# Patient Record
Sex: MALE | Race: WHITE | URBAN - METROPOLITAN AREA
[De-identification: names, ages, dates, MRNs, and addresses within clinical notes are randomized per-mention and may not be internally consistent; named-entity substitution may affect disease eponyms.]

---

## 2017-08-10 ENCOUNTER — HISTORICAL (OUTPATIENT)
Dept: LAB | Facility: HOSPITAL | Age: 52
End: 2017-08-10

## 2017-08-10 LAB
FOLATE SERPL-MCNC: 9.8 NG/ML (ref 3.1–17.5)
IRON SERPL-MCNC: 87 MCG/DL (ref 50–175)
VIT B12 SERPL-MCNC: 367 PG/ML (ref 193–986)

## 2021-07-18 ENCOUNTER — HISTORICAL (OUTPATIENT)
Dept: ADMINISTRATIVE | Facility: HOSPITAL | Age: 56
End: 2021-07-18

## 2021-08-04 ENCOUNTER — HISTORICAL (OUTPATIENT)
Dept: ADMINISTRATIVE | Facility: HOSPITAL | Age: 56
End: 2021-08-04

## 2021-11-29 ENCOUNTER — HISTORICAL (OUTPATIENT)
Dept: ADMINISTRATIVE | Facility: HOSPITAL | Age: 56
End: 2021-11-29

## 2025-03-27 ENCOUNTER — HOSPITAL ENCOUNTER (OUTPATIENT)
Dept: RADIOLOGY | Facility: HOSPITAL | Age: 60
Discharge: HOME | End: 2025-03-27
Attending: FAMILY MEDICINE
Payer: MEDICARE

## 2025-03-27 DIAGNOSIS — M54.50 LUMBAGO: ICD-10-CM

## 2025-03-27 PROCEDURE — 72100 X-RAY EXAM L-S SPINE 2/3 VWS: CPT | Mod: TC

## 2025-04-15 ENCOUNTER — HOSPITAL ENCOUNTER (OUTPATIENT)
Dept: RADIOLOGY | Facility: HOSPITAL | Age: 60
Discharge: HOME | End: 2025-04-15
Attending: FAMILY MEDICINE
Payer: MEDICARE

## 2025-04-15 DIAGNOSIS — M54.50 LOW BACK PAIN: ICD-10-CM

## 2025-04-15 PROCEDURE — 72148 MRI LUMBAR SPINE W/O DYE: CPT | Mod: TC

## 2025-05-01 DIAGNOSIS — M48.061 LUMBAR STENOSIS: Primary | ICD-10-CM

## 2025-05-06 ENCOUNTER — TELEPHONE (OUTPATIENT)
Dept: PAIN MEDICINE | Facility: CLINIC | Age: 60
End: 2025-05-06
Payer: MEDICARE

## 2025-05-06 NOTE — TELEPHONE ENCOUNTER
----- Message from Marina sent at 5/6/2025 12:51 PM CDT -----  Contact: pt  Type:  Patient Returning CallWho Called:ptWho Left Message for Patient:EdenDoes the patient know what this is regarding?:missed call about apptWould the patient rather a call back or a response via [facility]? Call [facility] Call Back Number:[phone] Additional Information: none

## 2025-06-11 ENCOUNTER — OFFICE VISIT (OUTPATIENT)
Dept: PAIN MEDICINE | Facility: CLINIC | Age: 60
End: 2025-06-11
Payer: MEDICARE

## 2025-06-11 VITALS
SYSTOLIC BLOOD PRESSURE: 133 MMHG | BODY MASS INDEX: 31.58 KG/M2 | WEIGHT: 185 LBS | DIASTOLIC BLOOD PRESSURE: 91 MMHG | HEART RATE: 80 BPM | HEIGHT: 64 IN | OXYGEN SATURATION: 98 %

## 2025-06-11 DIAGNOSIS — G89.29 CHRONIC BILATERAL LOW BACK PAIN WITHOUT SCIATICA: Primary | ICD-10-CM

## 2025-06-11 DIAGNOSIS — M79.18 MYOFASCIAL PAIN: ICD-10-CM

## 2025-06-11 DIAGNOSIS — M54.51 VERTEBROGENIC LOW BACK PAIN: ICD-10-CM

## 2025-06-11 DIAGNOSIS — M54.50 CHRONIC BILATERAL LOW BACK PAIN WITHOUT SCIATICA: Primary | ICD-10-CM

## 2025-06-11 PROCEDURE — 99204 OFFICE O/P NEW MOD 45 MIN: CPT | Mod: S$PBB,,, | Performed by: PHYSICAL MEDICINE & REHABILITATION

## 2025-06-11 RX ORDER — TAMSULOSIN HYDROCHLORIDE 0.4 MG/1
1 CAPSULE ORAL NIGHTLY
COMMUNITY
Start: 2025-05-19

## 2025-06-11 RX ORDER — TERBINAFINE HYDROCHLORIDE 250 MG/1
250 TABLET ORAL
COMMUNITY
Start: 2025-04-23 | End: 2025-06-11

## 2025-06-11 RX ORDER — DEXTROMETHORPHAN HYDROBROMIDE AND QUINIDINE SULFATE 20; 10 MG/1; MG/1
1 CAPSULE, GELATIN COATED ORAL 2 TIMES DAILY
COMMUNITY
Start: 2025-05-12

## 2025-06-11 RX ORDER — TEMAZEPAM 30 MG/1
30 CAPSULE ORAL NIGHTLY
COMMUNITY
Start: 2025-05-08

## 2025-06-11 RX ORDER — BUSPIRONE HYDROCHLORIDE 15 MG/1
15 TABLET ORAL 2 TIMES DAILY
COMMUNITY
Start: 2025-05-05

## 2025-06-11 RX ORDER — OXYCODONE AND ACETAMINOPHEN 10; 325 MG/1; MG/1
1 TABLET ORAL EVERY 8 HOURS PRN
COMMUNITY
Start: 2025-04-24 | End: 2025-06-11

## 2025-06-11 RX ORDER — OXCARBAZEPINE 150 MG/1
150 TABLET, FILM COATED ORAL 2 TIMES DAILY
COMMUNITY
Start: 2025-03-31

## 2025-06-11 RX ORDER — PRAZOSIN HYDROCHLORIDE 1 MG/1
2 CAPSULE ORAL NIGHTLY
COMMUNITY
Start: 2025-05-27

## 2025-06-11 RX ORDER — BACLOFEN 10 MG/1
10 TABLET ORAL NIGHTLY PRN
Qty: 30 TABLET | Refills: 2 | Status: SHIPPED | OUTPATIENT
Start: 2025-06-11 | End: 2025-09-09

## 2025-06-11 RX ORDER — PROPRANOLOL HYDROCHLORIDE 20 MG/1
TABLET ORAL
COMMUNITY
Start: 2025-05-19

## 2025-06-11 RX ORDER — CELECOXIB 200 MG/1
200 CAPSULE ORAL DAILY PRN
COMMUNITY
Start: 2025-03-26

## 2025-06-11 RX ORDER — SEMAGLUTIDE 0.68 MG/ML
INJECTION, SOLUTION SUBCUTANEOUS
COMMUNITY
Start: 2025-05-05

## 2025-06-11 RX ORDER — DESVENLAFAXINE 100 MG/1
100 TABLET, EXTENDED RELEASE ORAL
COMMUNITY
Start: 2025-05-19

## 2025-06-11 RX ORDER — TRAMADOL HYDROCHLORIDE 50 MG/1
50 TABLET, FILM COATED ORAL EVERY 6 HOURS PRN
COMMUNITY
Start: 2025-05-29

## 2025-06-11 RX ORDER — METHOCARBAMOL 500 MG/1
500 TABLET, FILM COATED ORAL 2 TIMES DAILY PRN
COMMUNITY
Start: 2025-03-26 | End: 2025-06-11

## 2025-06-11 RX ORDER — AMANTADINE HYDROCHLORIDE 100 MG/1
CAPSULE, GELATIN COATED ORAL
COMMUNITY
Start: 2025-05-26

## 2025-06-11 RX ORDER — DIVALPROEX SODIUM 500 MG/1
TABLET, FILM COATED, EXTENDED RELEASE ORAL
COMMUNITY
Start: 2025-05-26

## 2025-06-11 RX ORDER — CLONAZEPAM 1 MG/1
TABLET ORAL
COMMUNITY
Start: 2025-01-02

## 2025-06-11 RX ORDER — ATORVASTATIN CALCIUM 10 MG/1
10 TABLET, FILM COATED ORAL
COMMUNITY
Start: 2025-05-05

## 2025-06-11 NOTE — PROGRESS NOTES
[facility]      Referring Provider: Ramez Castillo Iii, Md  [address] [facility] [place] [address]    Chief Complaint:   Chief Complaint   Patient presents with    Low-back Pain       History of Present Illness: Andrés Lees is a 60 y.o. male referred by Dr. Ramez Castillo MD for low back pain.      LBP:  Onset: years of back pain and started seeking care about 6 months ago  Location: bilateral, but primarily right low back from the inferior rib to the iliac crest primarily over the QL  Radiation: none  Quality: Stabbing and Deep  Timing: Severity fluctuates, but always present  Exacerbating Factors: Bending Forward, Lifting, and Twisting/Turning  Alleviating Factors: Nothing  Review of Symptoms: Denies weakness, Denies numbness, Denies night sweats, Denies fevers, (+) pain waking at night, Denies history of cancer, Denies unexplained weight loss, Denies changes in bowel function, Denies changes in bladder function.    Patient has failed > 6 weeks of conservative treatment including: Activity modification (avoiding exacerbating factors) and oral medications.     Pain Severity Scores:   Currently: 4/10      Worst Pain: 9/10     Least Pain: 4/10  Average Pain: 5/10    Pain Disability Index  Family/Home Responsibilities:: 8  Recreation:: 4  Social Activity:: 3  Occupation:: 7  Sexual Behavior:: 10  Self Care:: 3  Life-Support Activities:: 3  Pain Disability Index (PDI): 38      Opioid Risk Score       None             Previous Therapies/Treatments:  Activity Modification (rest, avoiding aggravating factors, etc.): Yes - Not Helpful  Heat (heating pads, etc.):   Cold (ice packs):   Physician guided home exercise program:   PT/OT:   Chiropractor:   Acupuncture:   Massage:   Bracing:   TENS unit:     Previous Medications:   - NSAIDS: tylenol and ibuprofen don't help. celebrex  - Muscle Relaxants: robaxin  - TCAs:   - SNRIs/Antidepressants for Pain: desvenlafaxine  - Topicals:   -  "Anticonvulsants: oxcarbazepine  - Opioids: tramadol     Relevant Surgery: yes    - Cervical decompression and fusion many years ago    Previous Interventions for Pain:  -     Current Pain Medications:  Tramadol 50 mg  Celebrex 200 mg     Blood Thinners: none    Full Medication List:  Current Medications[1]     Review of Systems: See HPI    Allergies:  Biaxin [clarithromycin]      Medical History:   has a past medical history of Anxiety, Bipolar disorder, Diabetes mellitus, type 2, GERD (gastroesophageal reflux disease), Kidney stones, and Migraines.    Surgical History:   has a past surgical history that includes Spinal cord decompression.    Social History:   reports that he has never smoked. He has never used smokeless tobacco. He reports current alcohol use. He reports that he does not use drugs.    Family History:  family history includes Brain aneurysm in his mother; Suicide in his father.      Physical Exam:  BP (!) 133/91   Pulse 80   Ht 5' 4" (1.626 m)   Wt 83.9 kg (185 lb)   SpO2 98%   BMI 31.76 kg/m²   GEN: No acute distress. Calm, comfortable  HENT: Normocephalic, atraumatic, moist mucous membranes  EYE: Anicteric sclera, non-injected.   CV: Non-diaphoretic. Regular Rate. Radial Pulses 2+.  RESP: Breathing comfortably. Chest expansion symmetric.  EXT: No clubbing, cyanosis.   SKIN: Warm, & dry to palpation. No visible rashes or lesions of exposed skin.   PSYCH: Pleasant mood and appropriate affect. Recent and remote memory intact.   GAIT: Independent, normal ambulation  Lumbar Spine Exam:       Inspection: No erythema, bruising. No Lateral shift.       Palpation:   - (+) TTP of lumbar paraspinals on right.  - (-) TTP of sacroiliac joint bilaterally.  - (-) TTP of mildline spinous processes       ROM: (+) Limited in flexion. (+) limited in extension, (+) limitation in lateral bending bilateral.    Directional Preference:       Provocative Maneuvers:  (+) Facet loading on right  (-) Straight Leg Raise " bilaterally  (-) LEV bilaterally  Hip Exam:      Inspection: No gross deformity or apparent leg length discrepancy      Palpation: No TTP to greater trochanteric bursa(s) bilaterally.       ROM: (+) limitation in internal rotation bilateral, limitation in external rotation bilateral  Neurologic Exam:     Alert. Speech is fluent and appropriate.     Strength:  5/5 throughout bilateral lower extremities     Sensation:  Grossly intact to light touch in bilateral lower extremities     Reflexes: 2+ in b/l patella, achilles     Tone: No abnormality appreciated in bilateral lower extremities     No Clonus     Downgoing toes on plantar stimulation     (-) Cadena bilaterally                Imaging:  - MRI Lumbar Spine 3/27/25:     Alignment: Normal.     Vertebrae: No acute fracture or diffuse marrow placement process.  Heterogeneous marrow signal is likely related to marrow reconversion and small hemangiomas.     Discs: Multilevel disc desiccation.  Moderate disc space narrowing at T11-T12.  Small annular fissure at L3-L4.     Cord: Normal.  Conus terminates at L2.     Degenerative findings:     T12-L1: No significant spinal canal stenosis or neural foraminal narrowing.     L1-L2: No significant spinal canal stenosis or neural foraminal narrowing.     L2-L3: Circumferential broad-based disc bulge and mild facet arthropathy.  Mild spinal canal stenosis.  Moderate bilateral lateral recess narrowing.  Mild left neural foraminal narrowing.     L3-L4: Circumferential broad-based disc bulge, ligamentum flavum buckling and mild facet arthropathy.  Mild spinal canal stenosis.  Moderate bilateral lateral recess narrowing.  Mild bilateral neural foraminal narrowing.     L4-L5: Circumferential broad-based disc bulge and mild left facet arthropathy.  Mild lateral recess narrowing bilaterally.  Mild bilateral neural foraminal narrowing.     L5-S1: Small broad-based posterior disc bulge and mild facet arthropathy.  No significant  "spinal canal stenosis.  Mild right neural foraminal narrowing.     Paraspinal muscles & soft tissues: Unremarkable.    Labs:  BMP  No results found for: "NA", "K", "CL", "CO2", "BUN", "CREATININE", "CALCIUM", "ANIONGAP", "EGFRNORACEVR"  No results found for: "ALT", "AST", "GGT", "ALKPHOS", "BILITOT"  No results found for: "PLT"  No results found for: "LABA1C", "HGBA1C"      Assessment:  Andrés Lees is a 60 y.o. male with the following diagnoses based on history, exam, and imagin. Chronic bilateral low back pain without sciatica  -     Ambulatory referral/consult to Pain Clinic  -     Ambulatory Referral/Consult to Physical Therapy; Future; Expected date: 2025  -     baclofen (LIORESAL) 10 MG tablet; Take 1 tablet (10 mg total) by mouth nightly as needed (pain or spasm).  Dispense: 30 tablet; Refill: 2    2. Vertebrogenic low back pain  -     Ambulatory Referral/Consult to Physical Therapy; Future; Expected date: 2025  -     baclofen (LIORESAL) 10 MG tablet; Take 1 tablet (10 mg total) by mouth nightly as needed (pain or spasm).  Dispense: 30 tablet; Refill: 2    3. Myofascial pain  -     Ambulatory Referral/Consult to Physical Therapy; Future; Expected date: 2025  -     baclofen (LIORESAL) 10 MG tablet; Take 1 tablet (10 mg total) by mouth nightly as needed (pain or spasm).  Dispense: 30 tablet; Refill: 2        This is a pleasant 60 y.o. gentleman presenting with:     - Chronic bilateral low back pain:   - Multifactorial, primary pain appears to be strain of the quadratus lumborum, but he does have facet arthropathy as well and Modic 2 at L5-S1.   - Comorbidities: GERD. DM2. Bipolar. Anxiety. GERD. Migraines. Kidney stones. Tremor.     Treatment Plan:   - PT/OT/HEP: Refer to PT. Discussed benefits of exercise for pain.   - Procedures: Consider TPI into right QL   - Consider MBBs   - Consider intracept  - Medications: Rx for baclofen 10 mg qHS  - Imaging: Reviewed.   - Labs: " None      Follow Up: RTC in 6-8 weeks or sooner PRN      Lazara Lama MD  Interventional Pain Medicine          [1]   Current Outpatient Medications:     amantadine HCL (SYMMETREL) 100 mg capsule, TAKE 1 OR 2 CAPSULES BY MOUTH EVERY MORNING OR ONE CAPSULE EVERY MORNING AND ONE CAPSULE MIDDAY, Disp: , Rfl:     atorvastatin (LIPITOR) 10 MG tablet, Take 10 mg by mouth., Disp: , Rfl:     busPIRone (BUSPAR) 15 MG tablet, Take 15 mg by mouth 2 (two) times daily., Disp: , Rfl:     celecoxib (CELEBREX) 200 MG capsule, Take 200 mg by mouth daily as needed., Disp: , Rfl:     clonazePAM (KLONOPIN) 1 MG tablet, Take by mouth., Disp: , Rfl:     desvenlafaxine succinate (PRISTIQ) 100 MG Tb24, Take 100 mg by mouth., Disp: , Rfl:     divalproex ER (DEPAKOTE ER) 500 MG Tb24 24 hr tablet, TAKE 1 TABLET BY MOUTH EVERY MORNING AND 3 TABLETs AT BEDTIME, Disp: , Rfl:     NUEDEXTA 20-10 mg per capsule, Take 1 capsule by mouth 2 (two) times daily., Disp: , Rfl:     OXcarbazepine (TRILEPTAL) 150 MG Tab, Take 150 mg by mouth 2 (two) times daily., Disp: , Rfl:     OZEMPIC 0.25 mg or 0.5 mg (2 mg/3 mL) pen injector, inject 0.25 MG into THE SKIN ONCE WEEKLY, Disp: , Rfl:     prazosin (MINIPRESS) 1 MG Cap, Take 2 mg by mouth every evening., Disp: , Rfl:     propranoloL (INDERAL) 20 MG tablet, TAKE 1 OR 2 TABLETS BY MOUTH THREE TIMES A DAY AS NEEDED, Disp: , Rfl:     tamsulosin (FLOMAX) 0.4 mg Cap, Take 1 capsule by mouth every evening., Disp: , Rfl:     temazepam (RESTORIL) 30 mg capsule, Take 30 mg by mouth every evening., Disp: , Rfl:     traMADoL (ULTRAM) 50 mg tablet, Take 50 mg by mouth every 6 (six) hours as needed., Disp: , Rfl:     baclofen (LIORESAL) 10 MG tablet, Take 1 tablet (10 mg total) by mouth nightly as needed (pain or spasm)., Disp: 30 tablet, Rfl: 2

## 2025-07-24 ENCOUNTER — OFFICE VISIT (OUTPATIENT)
Dept: PAIN MEDICINE | Facility: CLINIC | Age: 60
End: 2025-07-24
Payer: MEDICARE

## 2025-07-24 VITALS
HEART RATE: 76 BPM | OXYGEN SATURATION: 99 % | DIASTOLIC BLOOD PRESSURE: 90 MMHG | WEIGHT: 180 LBS | SYSTOLIC BLOOD PRESSURE: 159 MMHG | HEIGHT: 63 IN | BODY MASS INDEX: 31.89 KG/M2

## 2025-07-24 DIAGNOSIS — M54.50 CHRONIC BILATERAL LOW BACK PAIN WITHOUT SCIATICA: ICD-10-CM

## 2025-07-24 DIAGNOSIS — M79.18 MYOFASCIAL PAIN: Primary | ICD-10-CM

## 2025-07-24 DIAGNOSIS — G89.29 CHRONIC BILATERAL LOW BACK PAIN WITHOUT SCIATICA: ICD-10-CM

## 2025-07-24 DIAGNOSIS — M54.51 VERTEBROGENIC LOW BACK PAIN: ICD-10-CM

## 2025-07-24 RX ORDER — METHYLPREDNISOLONE ACETATE 40 MG/ML
40 INJECTION, SUSPENSION INTRA-ARTICULAR; INTRALESIONAL; INTRAMUSCULAR; SOFT TISSUE
Status: DISCONTINUED | OUTPATIENT
Start: 2025-07-24 | End: 2025-07-24 | Stop reason: HOSPADM

## 2025-07-24 RX ORDER — LIDOCAINE HYDROCHLORIDE 10 MG/ML
9 INJECTION, SOLUTION EPIDURAL; INFILTRATION; INTRACAUDAL; PERINEURAL
Status: DISCONTINUED | OUTPATIENT
Start: 2025-07-24 | End: 2025-07-24 | Stop reason: HOSPADM

## 2025-07-24 RX ADMIN — METHYLPREDNISOLONE ACETATE 40 MG: 40 INJECTION, SUSPENSION INTRA-ARTICULAR; INTRALESIONAL; INTRAMUSCULAR; SOFT TISSUE at 02:07

## 2025-07-24 RX ADMIN — LIDOCAINE HYDROCHLORIDE 9 ML: 10 INJECTION, SOLUTION EPIDURAL; INFILTRATION; INTRACAUDAL; PERINEURAL at 02:07

## 2025-07-24 NOTE — PROCEDURES
Trigger Point Injection    Performed by: Cleopatra Quintero PA-C  Authorized by: Cleopatra Quintero PA-C    Lumbar Paraspinal:  Right    Consent Done?:  Yes (Written)    Pre-Procedure:   Indications:  Pain  Site marked: the procedure site was marked     Timeout: prior to procedure the correct patient, procedure, and site was verified      Local anesthesia used?: Yes    Anesthesia:  Local infiltration  Local anesthetic:  Lidocaine 1% without epinephrine  Medications: 9 mL LIDOcaine (PF) 10 mg/ml (1%) 10 mg/mL (1 %); 40 mg methylPREDNISolone acetate 40 mg/mL

## 2025-07-24 NOTE — PROGRESS NOTES
[facility]      Chief Complaint:   Chief Complaint   Patient presents with    Back Pain       History of Present Illness: Andrés Lees is a 60 y.o. male referred by Dr. Ramez Castillo MD for low back pain.      LBP:  Onset: years of back pain and started seeking care about 6 months ago  Location: bilateral, but primarily right low back from the inferior rib to the iliac crest primarily over the QL  Radiation: none  Quality: Stabbing and Deep  Timing: Severity fluctuates, but always present  Exacerbating Factors: Bending Forward, Lifting, and Twisting/Turning  Alleviating Factors: Nothing  Review of Symptoms: Denies weakness, Denies numbness, Denies night sweats, Denies fevers, (+) pain waking at night, Denies history of cancer, Denies unexplained weight loss, Denies changes in bowel function, Denies changes in bladder function.    Patient has failed > 6 weeks of conservative treatment including: Activity modification (avoiding exacerbating factors) and oral medications.     Pain Severity Scores:   Currently: 4/10      Worst Pain: 9/10     Least Pain: 4/10  Average Pain: 5/10    Pain Disability Index  Family/Home Responsibilities:: 8  Recreation:: 4  Social Activity:: 3  Occupation:: 7  Sexual Behavior:: 10  Self Care:: 3  Life-Support Activities:: 3  Pain Disability Index (PDI): 38      Opioid Risk Score       None             Previous Therapies/Treatments:  Activity Modification (rest, avoiding aggravating factors, etc.): Yes - Not Helpful  Heat (heating pads, etc.):   Cold (ice packs):   Physician guided home exercise program:   PT/OT:   Chiropractor:   Acupuncture:   Massage:   Bracing:   TENS unit:     Previous Medications:   - NSAIDS: tylenol and ibuprofen don't help. celebrex  - Muscle Relaxants: robaxin  - TCAs:   - SNRIs/Antidepressants for Pain: desvenlafaxine  - Topicals:   - Anticonvulsants: oxcarbazepine  - Opioids: tramadol     Relevant Surgery: yes    - Cervical decompression and  "fusion many years ago    Previous Interventions for Pain:  -     Interval History (07/24/2025):  Andrés Lees returns today for follow up.  At the last clinic visit, referred to physical therapy Discussed benefits of exercise for pain.    Physical therapy provided 0% relief, he has completed approximately 3 weeks.    Currently, the back pain is stable. Continues with pain to right lower back from right rib into right iliac crest. He also continues with pain across bilateral lower back, only with lifting. This is not currently limiting for him. Denies any changes in bowel or bladder function. Denies any new weakness or new numbness since the most recent visit. Denies any fevers or recent infections/antibiotics.     He attempted baclofen, provided limited relief.     Current Pain Scales:  Current: 8/10              Average: 6/10  Least-Worst: 2-9/10           7/24/2025     2:06 PM 6/11/2025     2:12 PM   Last 3 PDI Scores   Pain Disability Index (PDI) 53 38              Current Pain Medications:  Tramadol 50 mg  Celebrex 200 mg     Blood Thinners: none    Full Medication List:  Current Medications[1]     Review of Systems: See HPI    Allergies:  Biaxin [clarithromycin]      Medical History:   has a past medical history of Anxiety, Bipolar disorder, Diabetes mellitus, type 2, GERD (gastroesophageal reflux disease), Kidney stones, and Migraines.    Surgical History:   has a past surgical history that includes Spinal cord decompression.    Social History:   reports that he has never smoked. He has never used smokeless tobacco. He reports current alcohol use. He reports that he does not use drugs.    Family History:  family history includes Brain aneurysm in his mother; Suicide in his father.      Physical Exam:  BP (!) 159/90 (Patient Position: Sitting)   Pulse 76   Ht 5' 3" (1.6 m)   Wt 81.6 kg (180 lb)   SpO2 99%   BMI 31.89 kg/m²   GEN: No acute distress. Calm, comfortable  HENT: Normocephalic, atraumatic, " moist mucous membranes  EYE: Anicteric sclera, non-injected.   CV: Non-diaphoretic. Regular Rate. Radial Pulses 2+.  RESP: Breathing comfortably. Chest expansion symmetric.  EXT: No clubbing, cyanosis.   SKIN: Warm, & dry to palpation. No visible rashes or lesions of exposed skin.   PSYCH: Pleasant mood and appropriate affect. Recent and remote memory intact.   GAIT: Independent, normal ambulation  Lumbar Spine Exam:       Inspection: No erythema, bruising. No Lateral shift.       Palpation:   - (+) TTP of lumbar paraspinals on right.  - (-) TTP of sacroiliac joint bilaterally.  - (-) TTP of mildline spinous processes       ROM: (+) Limited in flexion. (+) limited in extension, (+) limitation in lateral bending bilateral.    Directional Preference:       Provocative Maneuvers:  (+) Facet loading on right  (-) Straight Leg Raise bilaterally  (-) LEV bilaterally  Hip Exam:      Inspection: No gross deformity or apparent leg length discrepancy      Palpation: No TTP to greater trochanteric bursa(s) bilaterally.       ROM: (+) limitation in internal rotation bilateral, limitation in external rotation bilateral  Neurologic Exam:     Alert. Speech is fluent and appropriate.     Strength:  5/5 throughout bilateral lower extremities     Sensation:  Grossly intact to light touch in bilateral lower extremities     Reflexes: 2+ in b/l patella, achilles     Tone: No abnormality appreciated in bilateral lower extremities     No Clonus     Downgoing toes on plantar stimulation     (-) Cadena bilaterally                Imaging:  - X-ray Lumbar spine 3/27/2025:   FINDINGS:  Miscellaneous: Mild vertebral body and facet joint spurring are noted throughout the lower thoracic and lumbar spine.  There is moderate disc space narrowing noted at the T10-T11 and to a lesser extent the T11-T12 levels.     Fractures:  No acute fractures are noted.     Alignment: No clinically significant spondylolisthesis is noted.     Soft tissue: I see  "no evidence of focal soft tissue swelling or paravertebral hematomas.    - MRI Lumbar Spine 3/27/25:  Alignment: Normal.     Vertebrae: No acute fracture or diffuse marrow placement process.  Heterogeneous marrow signal is likely related to marrow reconversion and small hemangiomas.     Discs: Multilevel disc desiccation.  Moderate disc space narrowing at T11-T12.  Small annular fissure at L3-L4.     Cord: Normal.  Conus terminates at L2.     Degenerative findings:     T12-L1: No significant spinal canal stenosis or neural foraminal narrowing.     L1-L2: No significant spinal canal stenosis or neural foraminal narrowing.     L2-L3: Circumferential broad-based disc bulge and mild facet arthropathy.  Mild spinal canal stenosis.  Moderate bilateral lateral recess narrowing.  Mild left neural foraminal narrowing.     L3-L4: Circumferential broad-based disc bulge, ligamentum flavum buckling and mild facet arthropathy.  Mild spinal canal stenosis.  Moderate bilateral lateral recess narrowing.  Mild bilateral neural foraminal narrowing.     L4-L5: Circumferential broad-based disc bulge and mild left facet arthropathy.  Mild lateral recess narrowing bilaterally.  Mild bilateral neural foraminal narrowing.     L5-S1: Small broad-based posterior disc bulge and mild facet arthropathy.  No significant spinal canal stenosis.  Mild right neural foraminal narrowing.     Paraspinal muscles & soft tissues: Unremarkable.    Labs:  BMP  No results found for: "NA", "K", "CL", "CO2", "BUN", "CREATININE", "CALCIUM", "ANIONGAP", "EGFRNORACEVR"  No results found for: "ALT", "AST", "GGT", "ALKPHOS", "BILITOT"  No results found for: "PLT"  No results found for: "LABA1C", "HGBA1C"      Assessment:  Andrés Lees is a 60 y.o. male with the following diagnoses based on history, exam, and imagin. Myofascial pain  -     Trigger Point Injection    2. Chronic bilateral low back pain without sciatica    3. Vertebrogenic low back " pain          This is a pleasant 60 y.o. gentleman presenting with:     - Chronic bilateral low back pain:   - Multifactorial, primary pain appears to be strain of the quadratus lumborum, but he does have facet arthropathy as well and Modic 2 at L5-S1.    - There is a focal area of pain in the right QL with exam revealing palpable taut hyperirritable band/nodule that does not refer.    - Patient has failed non-invasive conservative therapy  - Comorbidities: GERD. DM2. Bipolar. Anxiety. GERD. Migraines. Kidney stones. Tremor.     Treatment Plan:   - PT/OT/HEP: Cont PT. Discussed benefits of exercise for pain.   - Procedures: Performed TPI into right QL today in clinic.   - Consider MBBs   - Consider intracept  - Medications: Can increase to baclofen 20 mg qHS  - Imaging: Reviewed.   - Labs: None      Follow Up: RTC in 4 weeks or sooner PRN    I spent a total of 55 minutes on the day of the visit. This includes face to face time and non-face to face time preparing to see the patient (eg, review of tests), obtaining and/or reviewing separately obtained history, documenting clinical information in the electronic or other health record, independently interpreting results and communicating results to the patient/family/caregiver, or care coordinator.      Venus Quintero PA-C  Interventional Pain Medicine            [1]   Current Outpatient Medications:     amantadine HCL (SYMMETREL) 100 mg capsule, TAKE 1 OR 2 CAPSULES BY MOUTH EVERY MORNING OR ONE CAPSULE EVERY MORNING AND ONE CAPSULE MIDDAY, Disp: , Rfl:     atorvastatin (LIPITOR) 10 MG tablet, Take 10 mg by mouth., Disp: , Rfl:     baclofen (LIORESAL) 10 MG tablet, Take 1 tablet (10 mg total) by mouth nightly as needed (pain or spasm)., Disp: 30 tablet, Rfl: 2    busPIRone (BUSPAR) 15 MG tablet, Take 15 mg by mouth 2 (two) times daily., Disp: , Rfl:     celecoxib (CELEBREX) 200 MG capsule, Take 200 mg by mouth daily as needed., Disp: , Rfl:     clonazePAM (KLONOPIN) 1  MG tablet, Take by mouth., Disp: , Rfl:     desvenlafaxine succinate (PRISTIQ) 100 MG Tb24, Take 100 mg by mouth., Disp: , Rfl:     divalproex ER (DEPAKOTE ER) 500 MG Tb24 24 hr tablet, TAKE 1 TABLET BY MOUTH EVERY MORNING AND 3 TABLETs AT BEDTIME, Disp: , Rfl:     NUEDEXTA 20-10 mg per capsule, Take 1 capsule by mouth 2 (two) times daily., Disp: , Rfl:     OXcarbazepine (TRILEPTAL) 150 MG Tab, Take 150 mg by mouth 2 (two) times daily., Disp: , Rfl:     OZEMPIC 0.25 mg or 0.5 mg (2 mg/3 mL) pen injector, inject 0.25 MG into THE SKIN ONCE WEEKLY, Disp: , Rfl:     prazosin (MINIPRESS) 1 MG Cap, Take 2 mg by mouth every evening., Disp: , Rfl:     propranoloL (INDERAL) 20 MG tablet, TAKE 1 OR 2 TABLETS BY MOUTH THREE TIMES A DAY AS NEEDED, Disp: , Rfl:     tamsulosin (FLOMAX) 0.4 mg Cap, Take 1 capsule by mouth every evening., Disp: , Rfl:     temazepam (RESTORIL) 30 mg capsule, Take 30 mg by mouth every evening., Disp: , Rfl:     traMADoL (ULTRAM) 50 mg tablet, Take 50 mg by mouth every 6 (six) hours as needed., Disp: , Rfl:

## 2025-08-21 ENCOUNTER — OFFICE VISIT (OUTPATIENT)
Dept: PAIN MEDICINE | Facility: CLINIC | Age: 60
End: 2025-08-21
Payer: MEDICARE

## 2025-08-21 VITALS
DIASTOLIC BLOOD PRESSURE: 81 MMHG | OXYGEN SATURATION: 99 % | SYSTOLIC BLOOD PRESSURE: 127 MMHG | HEIGHT: 63 IN | WEIGHT: 180 LBS | BODY MASS INDEX: 31.89 KG/M2 | HEART RATE: 80 BPM

## 2025-08-21 DIAGNOSIS — G89.29 CHRONIC RIGHT-SIDED THORACIC BACK PAIN: Primary | ICD-10-CM

## 2025-08-21 DIAGNOSIS — M79.18 MYOFASCIAL PAIN: ICD-10-CM

## 2025-08-21 DIAGNOSIS — M54.50 CHRONIC BILATERAL LOW BACK PAIN WITHOUT SCIATICA: ICD-10-CM

## 2025-08-21 DIAGNOSIS — G89.29 CHRONIC BILATERAL LOW BACK PAIN WITHOUT SCIATICA: ICD-10-CM

## 2025-08-21 DIAGNOSIS — M54.6 CHRONIC RIGHT-SIDED THORACIC BACK PAIN: Primary | ICD-10-CM

## 2025-08-21 DIAGNOSIS — M54.6 PAIN IN THORACIC SPINE: ICD-10-CM

## 2025-08-21 PROCEDURE — 99215 OFFICE O/P EST HI 40 MIN: CPT | Mod: S$PBB,,, | Performed by: PHYSICIAN ASSISTANT

## 2025-08-21 RX ORDER — DICLOFENAC SODIUM 50 MG/1
50 TABLET, DELAYED RELEASE ORAL 2 TIMES DAILY
COMMUNITY

## 2025-08-26 ENCOUNTER — TELEPHONE (OUTPATIENT)
Dept: PAIN MEDICINE | Facility: CLINIC | Age: 60
End: 2025-08-26
Payer: MEDICARE

## 2025-08-26 ENCOUNTER — PATIENT MESSAGE (OUTPATIENT)
Dept: PAIN MEDICINE | Facility: CLINIC | Age: 60
End: 2025-08-26
Payer: MEDICARE

## 2025-08-26 DIAGNOSIS — M54.6 CHRONIC RIGHT-SIDED THORACIC BACK PAIN: Primary | ICD-10-CM

## 2025-08-26 DIAGNOSIS — M54.6 PAIN IN THORACIC SPINE: ICD-10-CM

## 2025-08-26 DIAGNOSIS — G89.29 CHRONIC RIGHT-SIDED THORACIC BACK PAIN: Primary | ICD-10-CM

## 2025-08-27 ENCOUNTER — LAB VISIT (OUTPATIENT)
Dept: LAB | Facility: HOSPITAL | Age: 60
End: 2025-08-27
Attending: PHYSICIAN ASSISTANT
Payer: MEDICARE

## 2025-08-27 DIAGNOSIS — G89.29 CHRONIC IDIOPATHIC ANAL PAIN: ICD-10-CM

## 2025-08-27 DIAGNOSIS — M54.6 PAIN IN THORACIC SPINE: Primary | ICD-10-CM

## 2025-08-27 DIAGNOSIS — K62.89 CHRONIC IDIOPATHIC ANAL PAIN: ICD-10-CM

## 2025-08-27 LAB
BASOPHILS # BLD AUTO: 0.06 X10(3)/MCL (ref 0.01–0.08)
BASOPHILS NFR BLD AUTO: 1.1 % (ref 0.1–1.2)
CRP SERPL-MCNC: <1 MG/L
EOSINOPHIL # BLD AUTO: 0.11 X10(3)/MCL (ref 0.04–0.54)
EOSINOPHIL NFR BLD AUTO: 2 % (ref 0.7–7)
ERYTHROCYTE [DISTWIDTH] IN BLOOD BY AUTOMATED COUNT: 12.4 %
ERYTHROCYTE [SEDIMENTATION RATE] IN BLOOD: <1 MM/HR (ref 0–15)
HCT VFR BLD AUTO: 42.1 % (ref 36–52)
HGB BLD-MCNC: 14.8 G/DL (ref 13–18)
IMM GRANULOCYTES # BLD AUTO: 0.01 X10(3)/MCL (ref 0–0.03)
IMM GRANULOCYTES NFR BLD AUTO: 0.2 % (ref 0–0.5)
LYMPHOCYTES # BLD AUTO: 2.13 X10(3)/MCL (ref 1.32–3.57)
LYMPHOCYTES NFR BLD AUTO: 38.4 % (ref 20–55)
MCH RBC QN AUTO: 32.5 PG (ref 27–34)
MCHC RBC AUTO-ENTMCNC: 35.2 G/DL (ref 31–37)
MCV RBC AUTO: 92.3 FL (ref 79–99)
MONOCYTES # BLD AUTO: 0.53 X10(3)/MCL (ref 0.3–0.82)
MONOCYTES NFR BLD AUTO: 9.6 % (ref 4.7–12.5)
NEUTROPHILS # BLD AUTO: 2.7 X10(3)/MCL (ref 1.78–5.38)
NEUTROPHILS NFR BLD AUTO: 48.7 % (ref 37–73)
NRBC BLD AUTO-RTO: 0 %
PLATELET # BLD AUTO: 184 X10(3)/MCL (ref 140–371)
PMV BLD AUTO: 9.5 FL (ref 9.4–12.4)
RBC # BLD AUTO: 4.56 X10(6)/MCL (ref 4–6)
WBC # BLD AUTO: 5.54 X10(3)/MCL (ref 4–11.5)

## 2025-08-27 PROCEDURE — 85652 RBC SED RATE AUTOMATED: CPT

## 2025-08-27 PROCEDURE — 86140 C-REACTIVE PROTEIN: CPT

## 2025-08-27 PROCEDURE — 36415 COLL VENOUS BLD VENIPUNCTURE: CPT

## 2025-08-27 PROCEDURE — 85025 COMPLETE CBC W/AUTO DIFF WBC: CPT
